# Patient Record
Sex: FEMALE | Race: WHITE | NOT HISPANIC OR LATINO | URBAN - METROPOLITAN AREA
[De-identification: names, ages, dates, MRNs, and addresses within clinical notes are randomized per-mention and may not be internally consistent; named-entity substitution may affect disease eponyms.]

---

## 2023-03-06 ENCOUNTER — NURSING HOME VISIT (OUTPATIENT)
Dept: POST ACUTE CARE | Facility: EXTERNAL LOCATION | Age: 83
End: 2023-03-06
Payer: MEDICARE

## 2023-03-06 DIAGNOSIS — R53.1 GENERALIZED WEAKNESS: ICD-10-CM

## 2023-03-06 DIAGNOSIS — S06.5XAD TRAUMATIC SUBDURAL HEMORRHAGE WITH LOSS OF CONSCIOUSNESS STATUS UNKNOWN, SUBSEQUENT ENCOUNTER: ICD-10-CM

## 2023-03-06 DIAGNOSIS — J44.9 CHRONIC OBSTRUCTIVE PULMONARY DISEASE, UNSPECIFIED COPD TYPE (MULTI): ICD-10-CM

## 2023-03-06 PROCEDURE — 99308 SBSQ NF CARE LOW MDM 20: CPT | Performed by: INTERNAL MEDICINE

## 2023-03-06 NOTE — LETTER
Subjective  Chief complaint: Shannon Negrete is a 82 y.o. female who is a acute skilled care  Patient.  Patient presents for weakness due to recent traumatic subdural hemorrhage.  HPI:  Patient presents for weakness.  Patient examined today at bedside.  Patient denies any pain or discomfort.  Patient continues working in therapy.  Therapy reports patient is able to perform sit to stands from recliner chair to wheelchair at mod assist.  Patient able to perform stand pivot transfers with contact-guard assist from toilet.  Patient requires assistance with hygiene and ADLs.  Staff reports no new issues.  No acute distress.        Review of Systems  All systems reviewed and negative except for what was mentioned in the HPI    Vital signs: 122/68, 78, 18, 98.0    Objective  Physical Exam  Constitutional:       General: She is not in acute distress.  Eyes:      Extraocular Movements: Extraocular movements intact.   Cardiovascular:      Rate and Rhythm: Regular rhythm.   Pulmonary:      Effort: Pulmonary effort is normal.      Breath sounds: Normal breath sounds.   Abdominal:      General: Bowel sounds are normal.      Palpations: Abdomen is soft.   Musculoskeletal:      Cervical back: Neck supple.      Right lower leg: No edema.      Left lower leg: No edema.   Neurological:      Mental Status: She is alert.   Psychiatric:         Mood and Affect: Mood normal.         Behavior: Behavior is cooperative.         Assessment/Plan  Problem List Items Addressed This Visit       Chronic obstructive pulmonary disease, unspecified (CMS/HCC)     No respiratory distress noted.  Continue albuterol as needed.         Generalized weakness     Continue PT OT         Traumatic subdural hemorrhage with loss of consciousness status unknown, subsequent encounter     Patient at baseline.  Continue therapy.          Medications, treatments, and labs reviewed  Continue medications and treatments as listed in [facility]    Scribe Attestation  By  signing my name below, I, Teri lee   attest that this documentation has been prepared under the direction and in the presence of Viki Small MD.    Provider Attestation - Scribe documentation  All medical record entries made by the Scribe were at my direction and personally dictated by me. I have reviewed the chart and agree that the record accurately reflects my personal performance of the history, physical exam, discussion and plan.

## 2023-03-07 PROBLEM — K21.9 GASTRO-ESOPHAGEAL REFLUX DISEASE WITHOUT ESOPHAGITIS: Status: ACTIVE | Noted: 2023-03-07

## 2023-03-07 PROBLEM — S06.5XAD: Status: ACTIVE | Noted: 2023-03-07

## 2023-03-07 PROBLEM — R53.1 GENERALIZED WEAKNESS: Status: ACTIVE | Noted: 2023-03-07

## 2023-03-07 PROBLEM — E03.9 HYPOTHYROIDISM, UNSPECIFIED: Status: ACTIVE | Noted: 2023-03-07

## 2023-03-07 PROBLEM — J44.9 CHRONIC OBSTRUCTIVE PULMONARY DISEASE, UNSPECIFIED (MULTI): Status: ACTIVE | Noted: 2023-03-07

## 2023-03-07 PROBLEM — F32.5 MAJOR DEPRESSIVE DISORDER, SINGLE EPISODE IN FULL REMISSION (CMS-HCC): Status: ACTIVE | Noted: 2023-03-07

## 2023-03-07 NOTE — PROGRESS NOTES
Subjective   Chief complaint: Shannon Negrete is a 82 y.o. female who is a acute skilled care  Patient.  Patient presents for weakness due to recent traumatic subdural hemorrhage.  HPI:  Patient presents for weakness.  Patient examined today at bedside.  Patient denies any pain or discomfort.  Patient continues working in therapy.  Therapy reports patient is able to perform sit to stands from recliner chair to wheelchair at mod assist.  Patient able to perform stand pivot transfers with contact-guard assist from toilet.  Patient requires assistance with hygiene and ADLs.  Staff reports no new issues.  No acute distress.        Review of Systems  All systems reviewed and negative except for what was mentioned in the HPI    Vital signs: 122/68, 78, 18, 98.0    Objective   Physical Exam  Constitutional:       General: She is not in acute distress.  Eyes:      Extraocular Movements: Extraocular movements intact.   Cardiovascular:      Rate and Rhythm: Regular rhythm.   Pulmonary:      Effort: Pulmonary effort is normal.      Breath sounds: Normal breath sounds.   Abdominal:      General: Bowel sounds are normal.      Palpations: Abdomen is soft.   Musculoskeletal:      Cervical back: Neck supple.      Right lower leg: No edema.      Left lower leg: No edema.   Neurological:      Mental Status: She is alert.   Psychiatric:         Mood and Affect: Mood normal.         Behavior: Behavior is cooperative.         Assessment/Plan   Problem List Items Addressed This Visit       Chronic obstructive pulmonary disease, unspecified (CMS/HCC)     No respiratory distress noted.  Continue albuterol as needed.         Generalized weakness     Continue PT OT         Traumatic subdural hemorrhage with loss of consciousness status unknown, subsequent encounter     Patient at baseline.  Continue therapy.          Medications, treatments, and labs reviewed  Continue medications and treatments as listed in [facility]    Scribe Attestation  By  signing my name below, I, Teri lee   attest that this documentation has been prepared under the direction and in the presence of Viki Small MD.    Provider Attestation - Scribe documentation  All medical record entries made by the Scribe were at my direction and personally dictated by me. I have reviewed the chart and agree that the record accurately reflects my personal performance of the history, physical exam, discussion and plan.

## 2023-03-17 NOTE — PROGRESS NOTES
Subjective   Chief complaint: Shannon Negrete is a 82 y.o. female who is a acute skilled care  Patient.  Patient presents for weakness due to recent traumatic subdural hemorrhage.  HPI:  Patient presents for weakness.  Patient examined today at bedside.  Patient denies any pain or discomfort.  Patient continues working in therapy.  Therapy reports patient is able to perform sit to stands from recliner chair to wheelchair at mod assist.  Patient able to perform stand pivot transfers with contact-guard assist from toilet.  Patient requires assistance with hygiene and ADLs.  Staff reports no new issues.  No acute distress.        Review of Systems  All systems reviewed and negative except for what was mentioned in the HPI    Vital signs: 122/68, 78, 18, 98.0    Objective   Physical Exam  Constitutional:       General: She is not in acute distress.  Eyes:      Extraocular Movements: Extraocular movements intact.   Cardiovascular:      Rate and Rhythm: Regular rhythm.   Pulmonary:      Effort: Pulmonary effort is normal.      Breath sounds: Normal breath sounds.   Abdominal:      General: Bowel sounds are normal.      Palpations: Abdomen is soft.   Musculoskeletal:      Cervical back: Neck supple.      Right lower leg: No edema.      Left lower leg: No edema.   Neurological:      Mental Status: She is alert.   Psychiatric:         Mood and Affect: Mood normal.         Behavior: Behavior is cooperative.         Assessment/Plan   Problem List Items Addressed This Visit          Nervous    Traumatic subdural hemorrhage with loss of consciousness status unknown, subsequent encounter     Patient at baseline.  Continue therapy.            Respiratory    Chronic obstructive pulmonary disease, unspecified (CMS/HCC)     No respiratory distress noted.  Continue albuterol as needed.            Other    Generalized weakness     Continue PT OT          Medications, treatments, and labs reviewed  Continue medications and treatments as  listed in [facility]    Scribe Attestation  By signing my name below, I, Teri lee   attest that this documentation has been prepared under the direction and in the presence of Viki Small MD.    Provider Attestation - Scribe documentation  All medical record entries made by the Scribe were at my direction and personally dictated by me. I have reviewed the chart and agree that the record accurately reflects my personal performance of the history, physical exam, discussion and plan.